# Patient Record
Sex: MALE | Race: WHITE | NOT HISPANIC OR LATINO | Employment: FULL TIME | URBAN - METROPOLITAN AREA
[De-identification: names, ages, dates, MRNs, and addresses within clinical notes are randomized per-mention and may not be internally consistent; named-entity substitution may affect disease eponyms.]

---

## 2023-05-18 PROBLEM — Z98.890 H/O BRAIN SURGERY: Status: ACTIVE | Noted: 2023-01-07

## 2023-05-18 PROBLEM — Z96.89 S/P PLACEMENT OF VNS (VAGUS NERVE STIMULATION) DEVICE: Status: ACTIVE | Noted: 2023-01-07

## 2023-05-18 PROBLEM — G40.019 LOCALIZATION-RELATED IDIOPATHIC EPILEPSY AND EPILEPTIC SYNDROMES WITH SEIZURES OF LOCALIZED ONSET, INTRACTABLE, WITHOUT STATUS EPILEPTICUS: Status: ACTIVE | Noted: 2018-09-27

## 2023-05-18 PROBLEM — Z98.890 STATUS POST CRANIOTOMY: Status: ACTIVE | Noted: 2019-01-24

## 2024-10-10 ENCOUNTER — OUTPATIENT CASE MANAGEMENT (OUTPATIENT)
Dept: ADMINISTRATIVE | Facility: OTHER | Age: 24
End: 2024-10-10
Payer: COMMERCIAL

## 2024-10-10 NOTE — PROGRESS NOTES
Sw received a consult to assist Patient in acquiring Perampanel. Sw called Patient ([phone]). A voice message was left requesting he call back.    Sonia España LCSW    [phone]

## 2024-10-15 ENCOUNTER — OUTPATIENT CASE MANAGEMENT (OUTPATIENT)
Dept: ADMINISTRATIVE | Facility: OTHER | Age: 24
End: 2024-10-15
Payer: COMMERCIAL

## 2024-10-15 NOTE — PROGRESS NOTES
Sw received a consult to assist Patient in obtaining the medicine Perampanel. Sw called and briefly spoke to Patient ([phone]). He was at a physician visit and unable to speak on the phone. Tommy will call back at another time.    Sonia España, KELLEY    [phone]

## 2024-10-16 ENCOUNTER — OUTPATIENT CASE MANAGEMENT (OUTPATIENT)
Dept: ADMINISTRATIVE | Facility: OTHER | Age: 24
End: 2024-10-16
Payer: COMMERCIAL

## 2024-10-16 NOTE — PROGRESS NOTES
Sw received a consult to assist Patient in obtaining Perampanel. Sw called Patient ([phone]). A voice message was left requesting he call back.    Sonia España LCSW    [phone]